# Patient Record
Sex: FEMALE
[De-identification: names, ages, dates, MRNs, and addresses within clinical notes are randomized per-mention and may not be internally consistent; named-entity substitution may affect disease eponyms.]

---

## 2017-09-22 ENCOUNTER — HOSPITAL ENCOUNTER (OUTPATIENT)
Dept: HOSPITAL 92 - ERS | Age: 27
Setting detail: OBSERVATION
LOS: 1 days | Discharge: HOME HEALTH | End: 2017-09-23
Attending: OBSTETRICS & GYNECOLOGY | Admitting: OBSTETRICS & GYNECOLOGY
Payer: COMMERCIAL

## 2017-09-22 VITALS — BODY MASS INDEX: 21.3 KG/M2

## 2017-09-22 DIAGNOSIS — R10.9: ICD-10-CM

## 2017-09-22 DIAGNOSIS — O99.89: Primary | ICD-10-CM

## 2017-09-22 DIAGNOSIS — Z3A.01: ICD-10-CM

## 2017-09-22 PROCEDURE — 99284 EMERGENCY DEPT VISIT MOD MDM: CPT

## 2017-09-22 PROCEDURE — 86850 RBC ANTIBODY SCREEN: CPT

## 2017-09-22 PROCEDURE — 82731 ASSAY OF FETAL FIBRONECTIN: CPT

## 2017-09-22 PROCEDURE — 86901 BLOOD TYPING SEROLOGIC RH(D): CPT

## 2017-09-22 PROCEDURE — 86900 BLOOD TYPING SEROLOGIC ABO: CPT
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
## 2017-09-23 VITALS — DIASTOLIC BLOOD PRESSURE: 51 MMHG | SYSTOLIC BLOOD PRESSURE: 100 MMHG | TEMPERATURE: 97.8 F
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
## 2017-12-06 ENCOUNTER — HOSPITAL ENCOUNTER (INPATIENT)
Dept: HOSPITAL 92 - L&D/OP | Age: 27
LOS: 3 days | Discharge: HOME | End: 2017-12-09
Attending: OBSTETRICS & GYNECOLOGY | Admitting: OBSTETRICS & GYNECOLOGY
Payer: COMMERCIAL

## 2017-12-06 VITALS — BODY MASS INDEX: 24.5 KG/M2

## 2017-12-06 DIAGNOSIS — Z3A.37: ICD-10-CM

## 2017-12-06 LAB
A1 MICROGLOB PLACENTAL VAG QL: (no result)
HCT VFR BLD CALC: 31.9 % (ref 36–47)
RBC # BLD AUTO: 3.5 MILL/UL (ref 4.2–5.4)
WBC # BLD AUTO: 8.6 THOU/UL (ref 4.8–10.8)

## 2017-12-06 PROCEDURE — 86850 RBC ANTIBODY SCREEN: CPT

## 2017-12-06 PROCEDURE — 85027 COMPLETE CBC AUTOMATED: CPT

## 2017-12-06 PROCEDURE — 86901 BLOOD TYPING SEROLOGIC RH(D): CPT

## 2017-12-06 PROCEDURE — 84112 EVAL AMNIOTIC FLUID PROTEIN: CPT

## 2017-12-06 PROCEDURE — 87340 HEPATITIS B SURFACE AG IA: CPT

## 2017-12-06 PROCEDURE — 86780 TREPONEMA PALLIDUM: CPT

## 2017-12-06 PROCEDURE — 86900 BLOOD TYPING SEROLOGIC ABO: CPT

## 2017-12-06 PROCEDURE — 36415 COLL VENOUS BLD VENIPUNCTURE: CPT

## 2017-12-07 RX ADMIN — DOCUSATE CALCIUM SCH MG: 240 CAPSULE, LIQUID FILLED ORAL at 09:29

## 2017-12-07 RX ADMIN — DOCUSATE CALCIUM SCH MG: 240 CAPSULE, LIQUID FILLED ORAL at 21:16

## 2017-12-07 NOTE — OP
DATE OF ENCOUNTER:  12/07/2017

 

The patient delivered a male infant by term spontaneous vaginal delivery at 37 weeks on 12/07/2017 at
 0246 hours.  Apgars were 8 and 9.  Weight is unavailable at the time of dictation.  Estimated blood 
loss is 200 mL.  Placenta delivered spontaneously followed by Pitocin infusion.  There are no lacerat
ions.  Dr. Miller is the delivering physician.  Mother and baby are both stable in the room in the i
mmediate postpartum.

## 2017-12-08 VITALS — TEMPERATURE: 98.1 F

## 2017-12-08 LAB
HCT VFR BLD CALC: 28.6 % (ref 36–47)
RBC # BLD AUTO: 3.13 MILL/UL (ref 4.2–5.4)
WBC # BLD AUTO: 8.1 THOU/UL (ref 4.8–10.8)

## 2017-12-08 RX ADMIN — DOCUSATE CALCIUM SCH MG: 240 CAPSULE, LIQUID FILLED ORAL at 08:41

## 2017-12-08 RX ADMIN — DOCUSATE CALCIUM SCH MG: 240 CAPSULE, LIQUID FILLED ORAL at 20:35

## 2017-12-08 NOTE — PDOC.PP
Post Partum Progress Note


Post Partum Day #: 1


Subjective: 


Doing well. No new issues.


PO intake tolerated: yes


Flatus: yes


Ambulation: yes


 Vital Signs (12 hours)











  Temp Pulse Resp BP Pulse Ox


 


 12/08/17 00:00  97.8 F  73  18  111/72 


 


 12/07/17 20:00  98.3 F  81  20  116/68  97








 Weight











Weight                         143 lb

















- Physical Examination


Cardiovascular: RRR


Abdominal: + bowel sounds, no distention


Extremities: negative homans (B)


Psychiatric: normal affect


Result Diagrams: 


 12/08/17 05:28





Additional Labs: 


 Post Partum Labs











Blood Type  O POSITIVE   12/06/17  21:00    


 


Hep Bs Antigen  Non-Reactive S/CO (NonReactive)   12/06/17  21:00    











(1) Vaginal delivery


Code(s): O80 - ENCOUNTER FOR FULL-TERM UNCOMPLICATED DELIVERY   Status: Acute   





(2) Postpartum care and examination


Code(s): Z39.2 - ENCOUNTER FOR ROUTINE POSTPARTUM FOLLOW-UP   Status: Acute   





- Assessment/Plan


Doing well postpartum Day 1. As mother was unknown GBS status, NICU has 

requested the mother stay until PPD 2 as baby will be watched for 48 hrs.


No acute needs now.

## 2017-12-09 VITALS — DIASTOLIC BLOOD PRESSURE: 65 MMHG | SYSTOLIC BLOOD PRESSURE: 111 MMHG

## 2017-12-09 RX ADMIN — DOCUSATE CALCIUM SCH MG: 240 CAPSULE, LIQUID FILLED ORAL at 08:32

## 2017-12-09 NOTE — DIS
DATE OF ADMISSION:  12/06/2017

 

DATE OF DISCHARGE:  12/09/2017

 

ADMITTING DIAGNOSIS:  Spontaneous rupture of membranes.

 

DISCHARGE DIAGNOSIS:  Spontaneous rupture of membranes.

 

PROCEDURE:  Term spontaneous vaginal delivery.

 

CONSULTATIONS:  None.

 

HOSPITAL COURSE:  The patient is a 27-year-old G1, now P1 female who presented with leakage of fluid 
and was diagnosed with spontaneous rupture of membranes.  She was admitted for expectant management o
f labor.  The patient quickly progressed into a labor pattern on her own and delivered a male infant 
by term spontaneous vaginal delivery.  Her postpartum course has been uncomplicated.  Postpartum hemo
globin 9.7, hematocrit 28.6, and platelets 195,000.  Today is postpartum day #2, she reports she is t
olerating p.o. well, voiding on her own, ambulating and having decreased lochia.  She has an appointm
ent with [facility] next week.

 

PHYSICAL EXAMINATION:

VITAL SIGNS:  Blood pressure 116/75, temperature 98.1, pulse of 82, respiratory rate of 18, and satti
ng 98% on room air.

GENERAL:  She appears to be in no acute distress.  She is alert and oriented, and cooperative and ple
asant to interact with.

ABDOMEN:  Fundus is firm.

EXTREMITIES:  Nontender and nonedematous.

 

DISCHARGE INSTRUCTIONS/PLAN:  The patient will be discharged to home with instructions to follow up w
[facility] as scheduled, which she reports is next week.  Patient will be discharged with Motrin
 to take as needed for pain.  She has been given instructions to follow up with [facility] or soone
r if she experiences fever, increasing pain or increased bleeding.

## 2020-01-07 ENCOUNTER — HOSPITAL ENCOUNTER (OUTPATIENT)
Dept: HOSPITAL 92 - BICULT | Age: 30
Discharge: HOME | End: 2020-01-07
Attending: OBSTETRICS & GYNECOLOGY
Payer: COMMERCIAL

## 2020-01-07 DIAGNOSIS — Z34.82: Primary | ICD-10-CM

## 2020-01-07 DIAGNOSIS — Z3A.24: ICD-10-CM

## 2020-01-07 PROCEDURE — 76805 OB US >/= 14 WKS SNGL FETUS: CPT

## 2020-01-07 NOTE — ULT
Obstetrical ultrasound:

1/7/2020



COMPARISON: None



HISTORY: 29-year-old pregnant female undergoing evaluation of fetal size, dates, and anatomy



TECHNIQUE: Multiplanar grayscale sonographic imaging of the gravid uterus obtained.



FINDINGS: Single live intrauterine gestation present demonstrating a breech presentation. Cervical le
ngth is estimated at 6.7 cm.



Placenta is located in a posterior position, with no evidence for previa or abruption.



The fetal stomach, kidneys, and heart appear grossly unremarkable. Fetal heart rate is 144 bpm. Umbil
ical cord insertion, urinary bladder, umbilical cord, nose and lips, spine, and intracranial

contents appear grossly unremarkable.



Amniotic fluid index is 12.4cm.



Fetal biometry:

Biparietal diameter is 6.2cm, correlating with a 25 weeks 1 daygestation

Head circumference is 22.6cm correlating with a 24 weeks 5 daysgestation

Abdominal circumference is 20.3cm correlating with a 25 weeks 0 day gestation

Femur length is 4.4cm correlating with a 24 week 4day gestation



Average age based on ultrasound is 24 weeks 6days with estimated date of delivery on 4/22/2020.



Estimated fetal weight is 731 +/- 107. 



IMPRESSION: Single intrauterine gestation as detailed above.



Reported By: Jaime Jessica 

Electronically Signed:  1/7/2020 4:38 PM

## 2020-04-20 ENCOUNTER — HOSPITAL ENCOUNTER (INPATIENT)
Dept: HOSPITAL 92 - L&D/OP | Age: 30
LOS: 2 days | Discharge: HOME | End: 2020-04-22
Attending: OBSTETRICS & GYNECOLOGY | Admitting: OBSTETRICS & GYNECOLOGY
Payer: SELF-PAY

## 2020-04-20 VITALS — BODY MASS INDEX: 26.2 KG/M2

## 2020-04-20 DIAGNOSIS — Z3A.39: ICD-10-CM

## 2020-04-20 LAB
HBSAG INDEX: 0.14 S/CO (ref 0–0.99)
HGB BLD-MCNC: 10.3 G/DL (ref 12–16)
MCH RBC QN AUTO: 30.4 PG (ref 27–31)
MCV RBC AUTO: 89.1 FL (ref 78–98)
PLATELET # BLD AUTO: 201 THOU/UL (ref 130–400)
RBC # BLD AUTO: 3.4 MILL/UL (ref 4.2–5.4)
SYPHILIS ANTIBODY INDEX: 0.1 S/CO
WBC # BLD AUTO: 10.3 THOU/UL (ref 4.8–10.8)

## 2020-04-20 PROCEDURE — 86780 TREPONEMA PALLIDUM: CPT

## 2020-04-20 PROCEDURE — 36415 COLL VENOUS BLD VENIPUNCTURE: CPT

## 2020-04-20 PROCEDURE — 99285 EMERGENCY DEPT VISIT HI MDM: CPT

## 2020-04-20 PROCEDURE — 85027 COMPLETE CBC AUTOMATED: CPT

## 2020-04-20 PROCEDURE — 86900 BLOOD TYPING SEROLOGIC ABO: CPT

## 2020-04-20 PROCEDURE — 86901 BLOOD TYPING SEROLOGIC RH(D): CPT

## 2020-04-20 PROCEDURE — 0KQM0ZZ REPAIR PERINEUM MUSCLE, OPEN APPROACH: ICD-10-PCS | Performed by: OBSTETRICS & GYNECOLOGY

## 2020-04-20 PROCEDURE — 87340 HEPATITIS B SURFACE AG IA: CPT

## 2020-04-20 PROCEDURE — 86850 RBC ANTIBODY SCREEN: CPT

## 2020-04-21 LAB
HGB BLD-MCNC: 10 G/DL (ref 12–16)
MCH RBC QN AUTO: 30.1 PG (ref 27–31)
MCV RBC AUTO: 88.6 FL (ref 78–98)
PLATELET # BLD AUTO: 193 THOU/UL (ref 130–400)
RBC # BLD AUTO: 3.32 MILL/UL (ref 4.2–5.4)
WBC # BLD AUTO: 12.6 THOU/UL (ref 4.8–10.8)

## 2020-04-21 RX ADMIN — DOCUSATE CALCIUM SCH MG: 240 CAPSULE, LIQUID FILLED ORAL at 21:48

## 2020-04-21 RX ADMIN — DOCUSATE CALCIUM SCH MG: 240 CAPSULE, LIQUID FILLED ORAL at 08:46

## 2020-04-22 VITALS — SYSTOLIC BLOOD PRESSURE: 117 MMHG | DIASTOLIC BLOOD PRESSURE: 75 MMHG | TEMPERATURE: 98.1 F

## 2020-04-22 RX ADMIN — DOCUSATE CALCIUM SCH MG: 240 CAPSULE, LIQUID FILLED ORAL at 08:52

## 2022-08-05 ENCOUNTER — HOSPITAL ENCOUNTER (INPATIENT)
Dept: HOSPITAL 92 - CSHERS | Age: 32
LOS: 1 days | Discharge: HOME | End: 2022-08-06
Attending: OBSTETRICS & GYNECOLOGY | Admitting: OBSTETRICS & GYNECOLOGY
Payer: COMMERCIAL

## 2022-08-05 VITALS — BODY MASS INDEX: 32.1 KG/M2

## 2022-08-05 DIAGNOSIS — Z3A.39: ICD-10-CM

## 2022-08-05 DIAGNOSIS — D64.9: ICD-10-CM

## 2022-08-05 DIAGNOSIS — Z20.822: ICD-10-CM

## 2022-08-05 LAB
HBSAG INDEX: 0.21 S/CO (ref 0–0.99)
HGB BLD-MCNC: 10.8 G/DL (ref 12–15.5)
HIV 1/2 INDEX: 0.06 S/CO (ref ?–1)
MCH RBC QN AUTO: 29.2 PG (ref 27–33)
MCV RBC AUTO: 86.5 FL (ref 81.6–98.3)
PLATELET # BLD AUTO: 226 10X3/UL (ref 150–450)
RBC # BLD AUTO: 3.7 10X6/UL (ref 3.9–5.03)
SYPHILIS ANTIBODY INDEX: 0.06 S/CO
WBC # BLD AUTO: 9.8 10X3/UL (ref 3.5–10.5)

## 2022-08-05 PROCEDURE — 86901 BLOOD TYPING SEROLOGIC RH(D): CPT

## 2022-08-05 PROCEDURE — U0002 COVID-19 LAB TEST NON-CDC: HCPCS

## 2022-08-05 PROCEDURE — 85027 COMPLETE CBC AUTOMATED: CPT

## 2022-08-05 PROCEDURE — 86780 TREPONEMA PALLIDUM: CPT

## 2022-08-05 PROCEDURE — 87340 HEPATITIS B SURFACE AG IA: CPT

## 2022-08-05 PROCEDURE — 99284 EMERGENCY DEPT VISIT MOD MDM: CPT

## 2022-08-05 PROCEDURE — 86900 BLOOD TYPING SEROLOGIC ABO: CPT

## 2022-08-05 PROCEDURE — 86850 RBC ANTIBODY SCREEN: CPT

## 2022-08-05 PROCEDURE — 87389 HIV-1 AG W/HIV-1&-2 AB AG IA: CPT

## 2022-08-06 VITALS — SYSTOLIC BLOOD PRESSURE: 126 MMHG | DIASTOLIC BLOOD PRESSURE: 79 MMHG | TEMPERATURE: 97.9 F
